# Patient Record
Sex: FEMALE | Race: BLACK OR AFRICAN AMERICAN
[De-identification: names, ages, dates, MRNs, and addresses within clinical notes are randomized per-mention and may not be internally consistent; named-entity substitution may affect disease eponyms.]

---

## 2017-07-30 NOTE — EMERGENCY ROOM VISIT NOTE
History


Report prepared by Aroldo:  Willam Quintero


Under the Supervision of:  Dr. Aydin Werner M.D.


First contact with patient:  12:57


Chief Complaint:  CHEST PAIN


Stated Complaint:  [facility]


Nursing Triage Summary:  


Pt c/o "severe indigestion, I've been belching a lot for 3 days. When I cough I 


feel like it's taking my breath away. I've had the cough for 6 weeks. And my 


chest feels so tight. "  





Had EKG at [facility]





Pt c/o congestion and Pain in her forehead bilaterally.





History of Present Illness


The patient is a 37 year old female who presents to the Emergency Room with 

complaints of worsening intermittent chest pain for the past three days that 

feels like tightness that lasts a minute or two. The patient states that she 

also has had a cough for the past six weeks, and she states that recently it 

has been taking her breath away. Also, she has indigestion, she has been 

belching a lot, and she has some nausea. She states that she has been anxious 

recently, though these are different symptoms from her past anxiety. The 

patient states that she has some chills, though she denies any fever. The 

patient denies any sore throat, recent long flights, history of blood clots, or 

family history of blood clots. She denies any history of cancer, abdominal 

surgery, heart issues, though her aunt had a heart attack. She states that she 

got her period last week, and she had dental surgery 8 weeks ago. Additionally, 

she states that she drinks alcohol almost every day and has a few drinks.





   Source of History:  patient


   Onset:  three days ago


   Position:  chest


   Quality:  other (tightness)


   Timing:  intermittent, worsening


   Associated Symptoms:  + chills, + cough, + nausea, No fevers


Note:


Associated symptoms: belching





Review of Systems


See HPI for pertinent positives and negatives.  A total of ten systems were 

reviewed and were otherwise negative.





Family History





Diabetes mellitus


Hypertension





Social History


Smoking Status:  Never Smoker


Marital Status:  


Housing Status:  lives with family


Occupation Status:  employed





Current/Historical Medications


Scheduled


Aspirin (Aspirin), 325 MG PO DAILY


Famotidine (Pepcid), 1 TAB PO BID





Allergies


Coded Allergies:  


     No Known Allergies (Verified  Allergy, Unknown, NONE, 9/25/09)





Physical Exam


Vital Signs











  Date Time  Temp Pulse Resp B/P (MAP) Pulse Ox O2 Delivery O2 Flow Rate FiO2


 


7/30/17 14:58  89 18 154/107 98 Room Air  


 


7/30/17 14:21  88 18 128/83 98 Room Air  


 


7/30/17 13:49  88 18 124/82 99 Room Air  


 


7/30/17 13:12     96 Room Air  


 


7/30/17 13:10  101      


 


7/30/17 13:10     98 Room Air  


 


7/30/17 12:52     100 Room Air  


 


7/30/17 12:49 37.1 100 20 160/93 100 Room Air  











Physical Exam


GENERAL: Awake, alert, well-appearing, in no distress


HENT: Mild tenderness to palpation of the maxillary frontal sinus. Boggy 

turbinates.  Mildly dry mucous membranes. Normocephalic, atraumatic. Oropharynx 

unremarkable.


EYES: Normal conjunctiva. Sclera non-icteric.


NECK: Supple. No nuchal rigidity. FROM. No JVD.


RESPIRATORY: Clear to auscultation.


CARDIAC: Mildly tachycardic rate in the low 100's, normal rhythm. Extremities 

warm and well perfused. Pulses equal.


ABDOMEN: Soft, non-distended. No tenderness to palpation. No rebound or 

guarding. No masses.


RECTAL: Deferred.


MUSCULOSKELETAL: Chest examination reveals no tenderness. The back is 

symmetrical on inspection without obvious abnormality. There is no CVA 

tenderness to palpation. No joint edema. 


LOWER EXTREMITIES: Calves are equal size bilaterally and non-tender. No edema. 

No discoloration. 


NEURO: Normal sensorium. No sensory or motor deficits noted. 


SKIN: No rash or jaundice noted.





Medical Decision & Procedures


ER Provider


Diagnostic Interpretation:


Radiology results as stated below per my review and radiologist interpretation: 











CHEST ONE VIEW PORTABLE





CLINICAL HISTORY: shortness of breath dyspnea





COMPARISON STUDY:  No previous studies for comparison.





FINDINGS: The bones soft tissues and hemidiaphragms are normal. The


cardiomediastinal silhouette is normal. The lungs are clear. The pulmonary


vasculature is normal. 





IMPRESSION:  Negative chest. 

















The above report was generated using voice recognition software.  It may contain


grammatical, syntax or spelling errors.








Electronically signed by:  Abhijeet Easley M.D.


7/30/2017 1:32 PM





Dictated Date/Time:  7/30/2017 1:32 PM





Laboratory Results


7/30/17 13:00








Red Blood Count 4.73, Mean Corpuscular Volume 71.2, Mean Corpuscular Hemoglobin 

21.1, Mean Corpuscular Hemoglobin Concent 29.7, Mean Platelet Volume 10.1, 

Neutrophils (%) (Auto) 64.8, Lymphocytes (%) (Auto) 25.1, Monocytes (%) (Auto) 

6.7, Eosinophils (%) (Auto) 3.0, Basophils (%) (Auto) 0.2, Neutrophils # (Auto) 

5.44, Lymphocytes # (Auto) 2.11, Monocytes # (Auto) 0.56, Eosinophils # (Auto) 

0.25, Basophils # (Auto) 0.02





7/30/17 13:00

















Test


  7/30/17


13:00


 


White Blood Count


  8.40 K/uL


(4.8-10.8)


 


Red Blood Count


  4.73 M/uL


(4.2-5.4)


 


Hemoglobin


  10.0 g/dL


(12.0-16.0)


 


Hematocrit 33.7 % (37-47) 


 


Mean Corpuscular Volume


  71.2 fL


()


 


Mean Corpuscular Hemoglobin


  21.1 pg


(25-34)


 


Mean Corpuscular Hemoglobin


Concent 29.7 g/dl


(32-36)


 


Platelet Count


  259 K/uL


(130-400)


 


Mean Platelet Volume


  10.1 fL


(7.4-10.4)


 


Neutrophils (%) (Auto) 64.8 % 


 


Lymphocytes (%) (Auto) 25.1 % 


 


Monocytes (%) (Auto) 6.7 % 


 


Eosinophils (%) (Auto) 3.0 % 


 


Basophils (%) (Auto) 0.2 % 


 


Neutrophils # (Auto)


  5.44 K/uL


(1.4-6.5)


 


Lymphocytes # (Auto)


  2.11 K/uL


(1.2-3.4)


 


Monocytes # (Auto)


  0.56 K/uL


(0.11-0.59)


 


Eosinophils # (Auto)


  0.25 K/uL


(0-0.5)


 


Basophils # (Auto)


  0.02 K/uL


(0-0.2)


 


RDW Standard Deviation


  44.3 fL


(36.4-46.3)


 


RDW Coefficient of Variation


  16.8 %


(11.5-14.5)


 


Immature Granulocyte % (Auto) 0.2 % 


 


Immature Granulocyte # (Auto)


  0.02 K/uL


(0.00-0.02)


 


Large Platelets 1+ 


 


Hypochromasia PRESENT 


 


Microcytosis PRESENT 


 


D-Dimer


  390 ug/L FEU


(0-500)


 


Anion Gap


  6.0 mmol/L


(3-11)


 


Est Creatinine Clear Calc


Drug Dose 114.4 ml/min 


 


 


Estimated GFR (


American) 117.2 


 


 


Estimated GFR (Non-


American 101.1 


 


 


BUN/Creatinine Ratio 11.9 (10-20) 


 


Calcium Level


  8.7 mg/dl


(8.5-10.1)


 


Magnesium Level


  1.9 mg/dl


(1.8-2.4)


 


Total Bilirubin


  0.3 mg/dl


(0.2-1)


 


Direct Bilirubin


  < 0.1 mg/dl


(0-0.2)


 


Aspartate Amino Transf


(AST/SGOT) 18 U/L (15-37) 


 


 


Alanine Aminotransferase


(ALT/SGPT) 31 U/L (12-78) 


 


 


Alkaline Phosphatase


  80 U/L


()


 


Troponin I


  < 0.015 ng/ml


(0-0.045)


 


Total Protein


  8.4 gm/dl


(6.4-8.2)


 


Albumin


  3.8 gm/dl


(3.4-5.0)


 


Lipase


  114 U/L


()


 


Human Chorionic Gonadotropin,


Qual NEG (NEG) 


 








Laboratory results reviewed by me





Medications Administered











 Medications


  (Trade)  Dose


 Ordered  Sig/Francisco


 Route  Start Time


 Stop Time Status Last Admin


Dose Admin


 


 Sodium Chloride  1,000 ml @ 


 999 mls/hr  Q1H1M STAT


 IV  7/30/17 13:18


 7/30/17 14:18 DC 7/30/17 13:46


999 MLS/HR


 


 Al Hydroxide/Mg


 Hydroxide


  (Maalox Susp)  30 ml  STK-MED ONCE


 .ROUTE  7/30/17 14:55


 7/30/17 14:56 DC 7/30/17 14:55


30 ML


 


 Lidocaine HCl


  (Viscous


 Lidocaine 2% Soln)  20 ml  STK-MED ONCE


 .ROUTE  7/30/17 14:55


 7/30/17 14:56 DC 7/30/17 14:55


20 ML











ECG


Indication:  chest pain


Rate (beats per minute):  96


Rhythm:  normal sinus


Findings:  no acute ischemic change, other (Normal axis)





ED Course


1302: The patient was evaluated in room A3. A complete history and physical 

exam was performed.





1318: Sodium Chloride 1000 ml @ 999 mls/hr IV





1455: Viscous Lidocaine 2% Soln 20ml PO, Maalox Susp 30ml PO





1458: I reevaluated the patient. Discussed results and discharge instructions: 

She verbalized understanding and agreement. The patient is ready for discharge.





Medical Decision


I reviewed the patient's past medical history, medications, and the nursing 

notes as described above.





Differential diagnosis:


Etiologies such as cardiac ischemia, aortic dissection, pulmonary embolism, 

pneumonia, pneumothorax, musculoskeletal, infections, pericarditis, myocarditis

, esophageal rupture, gastrointestinal, as well as others were entertained. 





Patient presents to emergency department with complaint of chest pain and 

shortness of breath intermittently over the past 3 days per history of present 

illness.  Arrival the patient appears mildly uncomfortable.  No acute distress.

  Afebrile stable vital signs.  EKG and chest x-ray are unremarkable.  WBC 

within normal limits.  Troponin and d-dimer negative.  The patient she was a 

hard score of 1 with low cardiac risk factors so ACS likely.  Patient is low 

risk well score and negative d-dimer therefore PE not likely.  Considering the 

patient's frequent belching patient was given a GI cocktail with improvement of 

her symptoms.  Symptoms most likely related to GERD.  Findings simply for PCP 

follow-up discussed with patient and patient was agreeable.  Discharged per 

instructions.





Medication Reconcilliation


Current Medication List:  was personally reviewed by me





Blood Pressure Screening


Patient's blood pressure:  Elevated blood pressure


Blood pressure disposition:  Elevated BP felt to be situational





Impression





 Primary Impression:  


 Chest pain


 Additional Impression:  


 GERD (gastroesophageal reflux disease)





Scribe Attestation


The scribe's documentation has been prepared under my direction and personally 

reviewed by me in its entirety. I confirm that the note above accurately 

reflects all work, treatment, procedures, and medical decision making performed 

by me.





Departure Information


Dispostion


Home / Self-Care





Prescriptions





Famotidine (PEPCID) 20 Mg Tab


1 TAB PO BID for 14 Days, #28 TAB 5 Refills


   Prov: Aydin Werner M.D.         7/30/17





Referrals


Barbara Tim D.O. (PCP)





Forms


HOME CARE DOCUMENTATION FORM,                                                 

               IMPORTANT VISIT INFORMATION





Patient Instructions


Chest Pain - [facility], ED GERD, [facility]





Additional Instructions





Please follow up with your primary care physician in the next 1-3 days.





Your exam, lab results, and chest xray did not show signs of an emergent 

condition.





Pepcid as directed.


Mucinex and saline nasal spray for nasal congestion as needed.





Return to the emergency department for worsening symptoms as described in the 

accompanying instructions.





Problem Qualifiers

## 2017-07-30 NOTE — DIAGNOSTIC IMAGING REPORT
CHEST ONE VIEW PORTABLE



CLINICAL HISTORY: shortness of breath dyspnea



COMPARISON STUDY:  No previous studies for comparison.



FINDINGS: The bones soft tissues and hemidiaphragms are normal. The

cardiomediastinal silhouette is normal. The lungs are clear. The pulmonary

vasculature is normal. 



IMPRESSION:  Negative chest. 











The above report was generated using voice recognition software.  It may contain

grammatical, syntax or spelling errors.









Electronically signed by:  Abhijeet Easley M.D.

7/30/2017 1:32 PM



Dictated Date/Time:  7/30/2017 1:32 PM

## 2018-04-18 ENCOUNTER — HOSPITAL ENCOUNTER (OUTPATIENT)
Dept: HOSPITAL 45 - C.LAB | Age: 38
Discharge: HOME | End: 2018-04-18
Attending: CHIROPRACTOR
Payer: COMMERCIAL

## 2018-04-18 DIAGNOSIS — M79.1: Primary | ICD-10-CM

## 2018-04-18 LAB
ALBUMIN SERPL-MCNC: 3.6 GM/DL (ref 3.4–5)
ALP SERPL-CCNC: 78 U/L (ref 45–117)
ALT SERPL-CCNC: 24 U/L (ref 12–78)
AST SERPL-CCNC: 11 U/L (ref 15–37)
BUN SERPL-MCNC: 11 MG/DL (ref 7–18)
CALCIUM SERPL-MCNC: 8.8 MG/DL (ref 8.5–10.1)
CO2 SERPL-SCNC: 25 MMOL/L (ref 21–32)
CREAT SERPL-MCNC: 0.73 MG/DL (ref 0.6–1.2)
GLUCOSE SERPL-MCNC: 96 MG/DL (ref 70–99)
POTASSIUM SERPL-SCNC: 3.5 MMOL/L (ref 3.5–5.1)
PROT SERPL-MCNC: 8.3 GM/DL (ref 6.4–8.2)
SODIUM SERPL-SCNC: 137 MMOL/L (ref 136–145)
T3FREE SERPL-MCNC: 2.99 PG/ML (ref 2.3–4.2)

## 2018-04-19 LAB — HBA1C MFR BLD: 5.3 % (ref 4.5–5.6)

## 2018-04-23 LAB
THYROGLOB AB SERPL-ACNC: <1 IU/ML
THYROPEROXIDASE AB SERPL-ACNC: 1 IU/ML (ref ?–9)